# Patient Record
Sex: FEMALE | Race: WHITE | NOT HISPANIC OR LATINO | Employment: OTHER | URBAN - METROPOLITAN AREA
[De-identification: names, ages, dates, MRNs, and addresses within clinical notes are randomized per-mention and may not be internally consistent; named-entity substitution may affect disease eponyms.]

---

## 2017-01-05 ENCOUNTER — APPOINTMENT (OUTPATIENT)
Dept: LAB | Facility: CLINIC | Age: 57
End: 2017-01-05
Payer: MEDICARE

## 2017-01-05 ENCOUNTER — TRANSCRIBE ORDERS (OUTPATIENT)
Dept: LAB | Facility: CLINIC | Age: 57
End: 2017-01-05

## 2017-01-05 DIAGNOSIS — Z11.9 SCREENING EXAMINATION FOR UNSPECIFIED INFECTIOUS DISEASE: Primary | ICD-10-CM

## 2017-01-05 PROCEDURE — 36415 COLL VENOUS BLD VENIPUNCTURE: CPT

## 2017-01-05 PROCEDURE — 86480 TB TEST CELL IMMUN MEASURE: CPT

## 2017-01-07 LAB
ANNOTATION COMMENT IMP: NORMAL
GAMMA INTERFERON BACKGROUND BLD IA-ACNC: 0.03 IU/ML
M TB IFN-G BLD-IMP: NEGATIVE
M TB IFN-G CD4+ BCKGRND COR BLD-ACNC: 0.02 IU/ML
M TB IFN-G CD4+ T-CELLS BLD-ACNC: 0.05 IU/ML
MITOGEN IGNF BLD-ACNC: >10 IU/ML
QUANTIFERON-TB GOLD IN TUBE: NORMAL
SERVICE CMNT-IMP: NORMAL

## 2021-02-19 ENCOUNTER — TRANSCRIBE ORDERS (OUTPATIENT)
Dept: ADMISSIONS | Facility: HOSPITAL | Age: 61
End: 2021-02-19

## 2023-12-12 ENCOUNTER — HOSPITAL ENCOUNTER (OUTPATIENT)
Dept: RADIOLOGY | Facility: HOSPITAL | Age: 63
Discharge: HOME | End: 2023-12-12
Payer: MEDICARE

## 2023-12-12 DIAGNOSIS — J44.9 CHRONIC OBSTRUCTIVE PULMONARY DISEASE, UNSPECIFIED COPD TYPE (HCC): ICD-10-CM

## 2023-12-12 DIAGNOSIS — F17.200 TOBACCO USE DISORDER: ICD-10-CM

## 2023-12-12 DIAGNOSIS — F12.90 MARIJUANA SMOKER: ICD-10-CM

## 2023-12-12 DIAGNOSIS — I73.9 PERIPHERAL VASCULAR DISEASE, UNSPECIFIED (HCC): ICD-10-CM

## 2023-12-12 PROCEDURE — 71271 CT THORAX LUNG CANCER SCR C-: CPT

## 2023-12-12 PROCEDURE — 76706 US ABDL AORTA SCREEN AAA: CPT

## 2024-01-03 ENCOUNTER — HOSPITAL ENCOUNTER (OUTPATIENT)
Dept: RADIOLOGY | Facility: HOSPITAL | Age: 64
Discharge: HOME | End: 2024-01-03
Payer: COMMERCIAL

## 2024-01-03 ENCOUNTER — OFFICE VISIT (OUTPATIENT)
Dept: CARDIOLOGY CLINIC | Facility: CLINIC | Age: 64
End: 2024-01-03
Payer: COMMERCIAL

## 2024-01-03 VITALS
HEART RATE: 75 BPM | OXYGEN SATURATION: 97 % | DIASTOLIC BLOOD PRESSURE: 80 MMHG | WEIGHT: 173 LBS | SYSTOLIC BLOOD PRESSURE: 112 MMHG | BODY MASS INDEX: 29.7 KG/M2

## 2024-01-03 DIAGNOSIS — Z13.1 SCREENING FOR DIABETES MELLITUS: ICD-10-CM

## 2024-01-03 DIAGNOSIS — E78.5 DYSLIPIDEMIA: Primary | ICD-10-CM

## 2024-01-03 DIAGNOSIS — J44.1 COPD EXACERBATION (HCC): ICD-10-CM

## 2024-01-03 DIAGNOSIS — I25.84 CORONARY ARTERY CALCIFICATION: ICD-10-CM

## 2024-01-03 DIAGNOSIS — I25.10 CORONARY ARTERY CALCIFICATION: ICD-10-CM

## 2024-01-03 DIAGNOSIS — R73.03 PREDIABETES: ICD-10-CM

## 2024-01-03 DIAGNOSIS — R09.89 CHEST CONGESTION: ICD-10-CM

## 2024-01-03 PROCEDURE — 99204 OFFICE O/P NEW MOD 45 MIN: CPT | Performed by: INTERNAL MEDICINE

## 2024-01-03 PROCEDURE — 93000 ELECTROCARDIOGRAM COMPLETE: CPT | Performed by: INTERNAL MEDICINE

## 2024-01-03 PROCEDURE — 71046 X-RAY EXAM CHEST 2 VIEWS: CPT

## 2024-01-03 RX ORDER — ALBUTEROL SULFATE 90 UG/1
2 AEROSOL, METERED RESPIRATORY (INHALATION) 4 TIMES DAILY
COMMUNITY
Start: 2023-10-16

## 2024-01-03 RX ORDER — ATORVASTATIN CALCIUM 20 MG/1
20 TABLET, FILM COATED ORAL DAILY
COMMUNITY
Start: 2023-10-16

## 2024-01-03 RX ORDER — GUAIFENESIN 600 MG/1
600 TABLET, EXTENDED RELEASE ORAL 2 TIMES DAILY
COMMUNITY
Start: 2023-12-27

## 2024-01-03 RX ORDER — ASPIRIN 81 MG/1
81 TABLET, CHEWABLE ORAL ONCE
Status: DISCONTINUED | OUTPATIENT
Start: 2024-01-03 | End: 2024-01-03

## 2024-01-03 RX ORDER — CITALOPRAM 40 MG/1
40 TABLET, FILM COATED ORAL DAILY
COMMUNITY

## 2024-01-03 RX ORDER — MELOXICAM 15 MG/1
15 TABLET ORAL DAILY PRN
COMMUNITY

## 2024-01-03 RX ORDER — CITALOPRAM 20 MG/1
20 TABLET ORAL DAILY
COMMUNITY

## 2024-01-03 RX ORDER — ALBUTEROL SULFATE 2.5 MG/3ML
360 SOLUTION RESPIRATORY (INHALATION) EVERY 4 HOURS PRN
COMMUNITY
Start: 2023-12-26

## 2024-01-03 RX ORDER — OMEGA-3 FATTY ACIDS/FISH OIL 300-1000MG
200 CAPSULE ORAL AS NEEDED
COMMUNITY

## 2024-01-03 NOTE — LETTER
January 3, 2024     JANNET Sullivan  [address]  [place] [address]    Patient: Lisa Devries   YOB: 1960   Date of Visit: 1/3/2024       Dear Ms. Shelley:    Thank you for referring Lisa Devries to me for evaluation. Below are my notes for this consultation.    If you have questions, please do not hesitate to call me. I look forward to following your patient along with you.         Sincerely,        Alex Tian MD        CC: No Recipients    Alex Tian MD  1/3/2024  1:50 PM  Sign when Signing Visit  Cardiology   Lisa Devries 63 y.o. female MRN: [id number]   Encounter: [id number]        Reason for Consult / Principal Problem: Coronary calcification    Provider Requesting Consult: JANNET Sullivan    PCP: JANNET Sullivan        Assessment/Plan:    >> Calcifications on coronary arteries  >> COPD  >> Dyslipidemia  >> Active smoking  >> Pre diabetes    Plan:    - Start baby aspirin 81 mg.  - Continue 20 mg atorvastatin.  Target LDL less than 55  - Repeat lipid profile and check HbA1c in 3 months  - Counseled extensively on quitting smoking  -COPD treatment per primary  -Return to office 4 to 5 months after repeat testing.        CC: Coronary calcifications/establish care    HPI  63 y.o. female with the above complaints presents to establish care.  She is a long-term smoker (greater than 30 pack years).  She was having a chest CT to screen for lung cancer.  Multiple nodules were detected, incidentally coronary calcifications were detected including calcifications in the left main and LAD.    She has no symptoms.  She does exercise by walking and has not had any chest pain or dyspnea on exertion.  She has chronic cough and expectoration and takes Mucinex for the same.      The patient denies chest pain, shortness of breath, palpitations, orthopnea, PND, pedal edema, syncope, presyncope, diaphoresis, nausea/vomiting               Physical exam  Objective  Vitals: Blood pressure 112/80,  pulse 75, weight 78.5 kg (173 lb), SpO2 97%.    General:  AO x3, no acute distress  Cardiac:  S1-S2 normal,  No murmurs. No rubs or gallops, JVP: normal  Lungs: Mild bilateral wheezing  Abdomen:  Soft, nontender, nondistended.  Extremities:  Warm, well perfused, pulses palpable, no ulcers or rashes. Edema:absent  Neuro: Grossly nonfocal        ======================================================  TREADMILL STRESS  No results found for this or any previous visit.     ----------------------------------------------------------------------------------------------  NUCLEAR STRESS TEST: No results found for this or any previous visit.    No results found for this or any previous visit.      --------------------------------------------------------------------------------  CATH:  No results found for this or any previous visit.    --------------------------------------------------------------------------------  ECHO: 12/6/2023:  •  Left Ventricle: Systolic function is low normal with an ejection   fraction of 50-55%. There is grade I (mild) diastolic dysfunction.   •  Left Atrium: Left atrium cavity is mildly dilated.     Left Ventricle   Left ventricle is normal in size. Wall thickness is normal. Systolic function is low normal with an ejection fraction of 50-55%. Wall motion is within normal limits. There is grade I (mild) diastolic dysfunction.     Right Ventricle   Right ventricle cavity is normal. Systolic function is normal.     Left Atrium   Left atrium cavity is mildly dilated.     Right Atrium   Right atrium cavity is normal.     IVC/SVC   The inferior vena cava demonstrates a diameter of <=21 mm and collapses >50%; therefore, the right atrial pressure is estimated at 0-5 mmHg.     Mitral Valve   Mitral valve structure is normal. There is no regurgitation or stenosis.     Tricuspid Valve   Tricuspid valve structure is normal. There is trace regurgitation. There is no evidence of tricuspid valve stenosis.      Aortic Valve   The aortic valve is trileaflet. There is no regurgitation or stenosis.     Pulmonic Valve   Pulmonic valve structure is normal. There is no regurgitation or stenosis.     Ascending Aorta   The aorta appears normal in size.     Pericardium   There is no pericardial effusion.       --------------------------------------------------------------------------------  HOLTER  No results found for this or any previous visit.    --------------------------------------------------------------------------------  CAROTIDS  No results found for this or any previous visit.       There are no diagnoses linked to this encounter.   ======================================================          Review of Systems  ROS as noted above, otherwise 12 point review of systems was performed and is negative.     Historical Information  No past medical history on file.  No past surgical history on file.  Social History     Substance and Sexual Activity   Alcohol Use None     Social History     Substance and Sexual Activity   Drug Use Not on file     Social History     Tobacco Use   Smoking Status Every Day   • Current packs/day: 0.50   • Average packs/day: 0.5 packs/day for 44.0 years (22.0 ttl pk-yrs)   • Types: Cigarettes   Smokeless Tobacco Never     No family history on file.    Meds/Allergies  Hospital Medications:   No current facility-administered medications for this visit.     Home Medications: (Not in a hospital admission)      Allergies   Allergen Reactions   • Other Anaphylaxis     Throat swells   • Cefadroxil Other (See Comments)   • Codeine Other (See Comments)     Reaction Date: 19May2011;     increased HR   • Gluten Meal - Food Allergy Other (See Comments)     sensitivity   • Indomethacin Other (See Comments)     Reaction Date: 19May2011;   • Oxycodone Other (See Comments)     percocet increased HR    Reaction Date: 19May2011;   • Tuberculin Ppd Other (See Comments)     She gets a positive reaction to it but  "does not have tb   • Sulfa Antibiotics Rash   • Tetracycline Rash         Portions of the record may have been created with voice recognition software.  Occasional wrong words or \"sound a like\" substitutions may have occurred due to the inherent limitations of voice recognition software.  Read the chart carefully and recognize, using context, where substitutions have occurred.        I have spent a total of 55 min in reviewing and/or ordering tests, medications, or procedures, performing an examination or evaluation, reviewing pertinent history, counseling and educating the patient, referring and/or communicating with other health care professionals, documenting in the EMR and general coordination of care of the patient today.               "

## 2024-01-03 NOTE — PROGRESS NOTES
Cardiology   Lisa Devries 63 y.o. female MRN: [id number]   Encounter: [id number]        Reason for Consult / Principal Problem: Coronary calcification    Provider Requesting Consult: JANNET Sullivan    PCP: JANNET Sullivan        Assessment/Plan:    >> Calcifications on coronary arteries  >> COPD  >> Dyslipidemia  >> Active smoking  >> Pre diabetes    Plan:    - Start baby aspirin 81 mg.  - Continue 20 mg atorvastatin.  Target LDL less than 55  - Repeat lipid profile and check HbA1c in 3 months  - Counseled extensively on quitting smoking  -COPD treatment per primary  -Return to office 4 to 5 months after repeat testing.        CC: Coronary calcifications/establish care    [place]  63 y.o. female with the above complaints presents to establish care.  She is a long-term smoker (greater than 30 pack years).  She was having a chest CT to screen for lung cancer.  Multiple nodules were detected, incidentally coronary calcifications were detected including calcifications in the left main and LAD.    She has no symptoms.  She does exercise by walking and has not had any chest pain or dyspnea on exertion.  She has chronic cough and expectoration and takes Mucinex for the same.      The patient denies chest pain, shortness of breath, palpitations, orthopnea, PND, pedal edema, syncope, presyncope, diaphoresis, nausea/vomiting               Physical exam  Objective   Vitals: Blood pressure 112/80, pulse 75, weight 78.5 kg (173 lb), SpO2 97%.    General:  AO x3, no acute distress  Cardiac:  S1-S2 normal,  No murmurs. No rubs or gallops, JVP: normal  Lungs: Mild bilateral wheezing  Abdomen:  Soft, nontender, nondistended.  Extremities:  Warm, well perfused, pulses palpable, no ulcers or rashes. Edema:absent  Neuro: Grossly nonfocal        ======================================================  TREADMILL STRESS  No results found for this or any previous visit.      ----------------------------------------------------------------------------------------------  NUCLEAR STRESS TEST: No results found for this or any previous visit.    No results found for this or any previous visit.      --------------------------------------------------------------------------------  CATH:  No results found for this or any previous visit.    --------------------------------------------------------------------------------  ECHO: 12/6/2023:    Left Ventricle: Systolic function is low normal with an ejection   fraction of 50-55%. There is grade I (mild) diastolic dysfunction.     Left Atrium: Left atrium cavity is mildly dilated.     Left Ventricle   Left ventricle is normal in size. Wall thickness is normal. Systolic function is low normal with an ejection fraction of 50-55%. Wall motion is within normal limits. There is grade I (mild) diastolic dysfunction.     Right Ventricle   Right ventricle cavity is normal. Systolic function is normal.     Left Atrium   Left atrium cavity is mildly dilated.     Right Atrium   Right atrium cavity is normal.     IVC/SVC   The inferior vena cava demonstrates a diameter of <=21 mm and collapses >50%; therefore, the right atrial pressure is estimated at 0-5 mmHg.     Mitral Valve   Mitral valve structure is normal. There is no regurgitation or stenosis.     Tricuspid Valve   Tricuspid valve structure is normal. There is trace regurgitation. There is no evidence of tricuspid valve stenosis.     Aortic Valve   The aortic valve is trileaflet. There is no regurgitation or stenosis.     Pulmonic Valve   Pulmonic valve structure is normal. There is no regurgitation or stenosis.     Ascending Aorta   The aorta appears normal in size.     Pericardium   There is no pericardial effusion.       --------------------------------------------------------------------------------  HOLTER  No results found for this or any previous  "visit.    --------------------------------------------------------------------------------  CAROTIDS  No results found for this or any previous visit.       There are no diagnoses linked to this encounter.   ======================================================          Review of Systems  ROS as noted above, otherwise 12 point review of systems was performed and is negative.     Historical Information   No past medical history on file.  No past surgical history on file.  Social History     Substance and Sexual Activity   Alcohol Use None     Social History     Substance and Sexual Activity   Drug Use Not on file     Social History     Tobacco Use   Smoking Status Every Day    Current packs/day: 0.50    Average packs/day: 0.5 packs/day for 44.0 years (22.0 ttl pk-yrs)    Types: Cigarettes   Smokeless Tobacco Never     No family history on file.    Meds/Allergies   Hospital Medications:   No current facility-administered medications for this visit.     Home Medications: (Not in a hospital admission)      Allergies   Allergen Reactions    Other Anaphylaxis     Throat swells    Cefadroxil Other (See Comments)    Codeine Other (See Comments)     Reaction Date: 19May2011;     increased HR    Gluten Meal - Food Allergy Other (See Comments)     sensitivity    Indomethacin Other (See Comments)     Reaction Date: 19May2011;    Oxycodone Other (See Comments)     percocet increased HR    Reaction Date: 19May2011;    Tuberculin Ppd Other (See Comments)     She gets a positive reaction to it but does not have tb    Sulfa Antibiotics Rash    Tetracycline Rash         Portions of the record may have been created with voice recognition software.  Occasional wrong words or \"sound a like\" substitutions may have occurred due to the inherent limitations of voice recognition software.  Read the chart carefully and recognize, using context, where substitutions have occurred.        I have spent a total of 55 min in reviewing and/or " ordering tests, medications, or procedures, performing an examination or evaluation, reviewing pertinent history, counseling and educating the patient, referring and/or communicating with other health care professionals, documenting in the EMR and general coordination of care of the patient today.

## 2024-02-05 ENCOUNTER — HOSPITAL ENCOUNTER (OUTPATIENT)
Dept: RADIOLOGY | Facility: HOSPITAL | Age: 64
Discharge: HOME | End: 2024-02-05
Payer: COMMERCIAL

## 2024-02-05 DIAGNOSIS — J44.1 OBSTRUCTIVE CHRONIC BRONCHITIS WITH EXACERBATION (HCC): ICD-10-CM

## 2024-02-05 DIAGNOSIS — R09.89 ABNORMAL CHEST SOUNDS: ICD-10-CM

## 2024-02-05 PROCEDURE — 71046 X-RAY EXAM CHEST 2 VIEWS: CPT

## 2025-01-08 ENCOUNTER — HOSPITAL ENCOUNTER (EMERGENCY)
Facility: HOSPITAL | Age: 65
Discharge: HOME | End: 2025-01-08
Attending: EMERGENCY MEDICINE | Admitting: EMERGENCY MEDICINE
Payer: COMMERCIAL

## 2025-01-08 ENCOUNTER — APPOINTMENT (EMERGENCY)
Dept: RADIOLOGY | Facility: HOSPITAL | Age: 65
End: 2025-01-08
Payer: COMMERCIAL

## 2025-01-08 VITALS
DIASTOLIC BLOOD PRESSURE: 80 MMHG | OXYGEN SATURATION: 96 % | TEMPERATURE: 97.9 F | RESPIRATION RATE: 18 BRPM | HEART RATE: 88 BPM | SYSTOLIC BLOOD PRESSURE: 114 MMHG

## 2025-01-08 DIAGNOSIS — S20.211A CHEST WALL CONTUSION, RIGHT, INITIAL ENCOUNTER: ICD-10-CM

## 2025-01-08 DIAGNOSIS — J06.9 VIRAL URI WITH COUGH: Primary | ICD-10-CM

## 2025-01-08 LAB
ATRIAL RATE: 80 BPM
P AXIS: 82 DEGREES
PR INTERVAL: 140 MS
QRS AXIS: 80 DEGREES
QRSD INTERVAL: 72 MS
QT INTERVAL: 372 MS
QTC INTERVAL: 430 MS
T WAVE AXIS: 89 DEGREES
VENTRICULAR RATE: 80 BPM

## 2025-01-08 PROCEDURE — 99284 EMERGENCY DEPT VISIT MOD MDM: CPT | Performed by: EMERGENCY MEDICINE

## 2025-01-08 PROCEDURE — 93005 ELECTROCARDIOGRAM TRACING: CPT

## 2025-01-08 PROCEDURE — 96361 HYDRATE IV INFUSION ADD-ON: CPT

## 2025-01-08 PROCEDURE — 99284 EMERGENCY DEPT VISIT MOD MDM: CPT

## 2025-01-08 PROCEDURE — 93010 ELECTROCARDIOGRAM REPORT: CPT | Performed by: INTERNAL MEDICINE

## 2025-01-08 PROCEDURE — 96374 THER/PROPH/DIAG INJ IV PUSH: CPT

## 2025-01-08 PROCEDURE — 71101 X-RAY EXAM UNILAT RIBS/CHEST: CPT

## 2025-01-08 RX ORDER — ONDANSETRON 2 MG/ML
4 INJECTION INTRAMUSCULAR; INTRAVENOUS ONCE
Status: COMPLETED | OUTPATIENT
Start: 2025-01-08 | End: 2025-01-08

## 2025-01-08 RX ORDER — ONDANSETRON 4 MG/1
4 TABLET, FILM COATED ORAL EVERY 6 HOURS
Qty: 12 TABLET | Refills: 0 | Status: SHIPPED | OUTPATIENT
Start: 2025-01-08

## 2025-01-08 RX ADMIN — ONDANSETRON 4 MG: 2 INJECTION INTRAMUSCULAR; INTRAVENOUS at 12:21

## 2025-01-08 RX ADMIN — SODIUM CHLORIDE 1000 ML: 0.9 INJECTION, SOLUTION INTRAVENOUS at 12:27

## 2025-01-08 NOTE — ED PROVIDER NOTES
Final Diagnosis:  1. Viral URI with cough    2. Chest wall contusion, right, initial encounter      Chief Complaint   Patient presents with    Cough    Fall     Pt states she has been having dizziness, cough, vomiting, chills.  Pt fell Friday on her hands and knees.  Pt denies any head strike.       64-year-old woman presents for evaluation of lightheadedness, cough, vomiting, myalgias.  Some symptoms ongoing since November mostly nasal congestion.  Patient fell last week onto her right side complains of right sided rib pain no difficulty breathing however does have pain when she takes deep breath.  No other complaints at this time.    Review of systems: Noted as above      PMH:  Past Medical History:   Diagnosis Date    COPD (chronic obstructive pulmonary disease) (HCC)     Fibromyalgia     Osteoarthritis      PSH:  History reviewed. No pertinent surgical history.    PE:   Vitals:    01/08/25 1149   BP: 114/80   BP Location: Left arm   Pulse: 88   Resp: 18   Temp: 97.9 °F (36.6 °C)   TempSrc: Temporal   SpO2: 96%       General: VSS, NAD, awake, alert. Well-nourished, well-developed. Appears stated age.   Speaking normally in full sentences.   Head: Normocephalic, atraumatic, nontender.  Eyes: PERRL, EOM-I. No diplopia.   No hyphema.   No subconjunctival hemorrhages.  Symmetrical lids.   ENT: Atraumatic external nose and ears.    MMM  No malocclusion. No stridor. Normal phonation. No drooling. Normal swallowing.   Neck: Symmetric, trachea midline. No JVD.  CV: RRR. +S1/S2  No murmurs or gallops  Peripheral pulses +2 throughout. R chest wall TTP over rib 7-10 midaxillary region.  Lungs:   Unlabored No retractions  CTAB, lungs sounds equal bilateral.   No tachypnea.   Abd: +BS, soft, NT/ND.   MSK:   FROM   Back:   No rashes  Skin: Dry, intact.   Neuro: AAOx3, GCS 15, CN II-XII grossly intact.   Motor grossly intact.  Psychiatric/Behavioral: Appropriate mood and affect   Exam: deferred        A:  - multiple c/o  likely viral syndrome  - dehydration  - fall, R chest wall contusion. Consider fx  P:  - IV fluids  - zofran  - Rib series R side.  - 13 point ROS was performed and all are normal unless stated in the history above.   - Nursing note reviewed. Vitals reviewed.   - Orders placed by myself and/or advanced practitioner / resident.    - Previous chart was reviewed  - No language barrier.   - History obtained from patient.   - There are no limitations to the history obtained.   ED Course as of 01/08/25 1326   Wed Jan 08, 2025   1324 Feels better, requests work note. Given long duration of sinus symptoms, will rx amox/clav. Clarified that pts allergy to cefadroxil (similar side chain to pen/amox) is a rash. If this is all that occurs, that is ok. If she develops additional symptoms with her rash she should dc abx.     Medications   sodium chloride 0.9 % bolus 1,000 mL (1,000 mL Intravenous New Bag 1/8/25 1227)   ondansetron (ZOFRAN) injection 4 mg (4 mg Intravenous Given 1/8/25 1221)     XR ribs with pa chest min 3 views RIGHT   ED Interpretation   No acute osseous abnormality. No acute cardiopulmonary pathology.              Orders Placed This Encounter   Procedures    XR ribs with pa chest min 3 views RIGHT    Insert peripheral IV    ECG 12 lead     Labs Reviewed - No data to display  Time reflects when diagnosis was documented in both MDM as applicable and the Disposition within this note       Time User Action Codes Description Comment    1/8/2025  1:14 PM Isma Fox Add [J06.9] Viral URI with cough     1/8/2025  1:14 PM Isma Fox Add [S20.211A] Chest wall contusion, right, initial encounter           ED Disposition       ED Disposition   Discharge    Condition   Stable    Date/Time   Wed Jan 8, 2025  1:14 PM    Comment   Lisa Devries discharge to home/self care.                   Follow-up Information       Follow up With Specialties Details Why Contact Info    [facility], Nurse  "Practitioner Schedule an appointment as soon as possible for a visit   [address]  [place] [address]  [phone]            Patient's Medications   Discharge Prescriptions    AMOXICILLIN-CLAVULANATE (AUGMENTIN) 875-125 MG PER TABLET    Take 1 tablet by mouth every 12 (twelve) hours for 7 days       Start Date: 1/8/2025  End Date: 1/15/2025       Order Dose: 1 tablet       Quantity: 14 tablet    Refills: 0    ONDANSETRON (ZOFRAN) 4 MG TABLET    Take 1 tablet (4 mg total) by mouth every 6 (six) hours       Start Date: 1/8/2025  End Date: --       Order Dose: 4 mg       Quantity: 12 tablet    Refills: 0     No discharge procedures on file.  Prior to Admission Medications   Prescriptions Last Dose Informant Patient Reported? Taking?   Acetaminophen 500 MG   Yes No   Sig: Take 500 mg by mouth every 6 (six) hours as needed   CeleXA 40 MG tablet   Yes No   Sig: Take 40 mg by mouth daily   Ibuprofen 200 MG CAPS   Yes No   Sig: Take 200 mg by mouth if needed   Mucus Relief 600 MG 12 hr tablet   Yes No   Sig: Take 600 mg by mouth 2 (two) times a day   albuterol (2.5 mg/3 mL) 0.083 % nebulizer solution   Yes No   Sig: Take 360 mL by nebulization every 4 (four) hours as needed   albuterol (PROVENTIL HFA,VENTOLIN HFA) 90 mcg/act inhaler   Yes No   Sig: Inhale 2 puffs 4 (four) times a day   atorvastatin (LIPITOR) 20 mg tablet   Yes No   Sig: Take 20 mg by mouth daily   citalopram (CeleXA) 20 mg tablet   Yes No   Sig: Take 20 mg by mouth daily   enoxaparin (LOVENOX) 40 mg/0.4 mL   Yes No   Sig: Inject 40 mg under the skin   Patient not taking: Reported on 1/3/2024   meloxicam (MOBIC) 15 mg tablet   Yes No   Sig: Take 15 mg by mouth daily as needed      Facility-Administered Medications: None       Portions of the record may have been created with voice recognition software. Occasional wrong word or \"sound a like\" substitutions may have occurred due to the inherent limitations of voice recognition software. Read the " chart carefully and recognize, using context, where substitutions have occurred.    Electronically signed by:  DO Isma Dill,   01/08/25 1324       Isma Fox,   01/08/25 132

## 2025-01-08 NOTE — Clinical Note
Lisa Devries was seen and treated in our emergency department on 1/8/2025.    No restrictions            Diagnosis:       .    She may return on this date: 01/13/2025         If you have any questions or concerns, please don't hesitate to call.      Isma Fox, DO    ______________________________           _______________          _______________  Hospital Representative                              Date                                Time

## 2025-01-21 ENCOUNTER — TELEPHONE (OUTPATIENT)
Age: 65
End: 2025-01-21

## 2025-01-21 NOTE — TELEPHONE ENCOUNTER
Daphney called from dental practice to ask if patient needed to premed. Advised patient is not an ortho patient.

## 2025-02-01 ENCOUNTER — HOSPITAL ENCOUNTER (EMERGENCY)
Facility: HOSPITAL | Age: 65
Discharge: HOME | End: 2025-02-01
Attending: EMERGENCY MEDICINE | Admitting: EMERGENCY MEDICINE
Payer: COMMERCIAL

## 2025-02-01 VITALS
RESPIRATION RATE: 16 BRPM | SYSTOLIC BLOOD PRESSURE: 126 MMHG | OXYGEN SATURATION: 97 % | WEIGHT: 167 LBS | TEMPERATURE: 97.1 F | HEART RATE: 94 BPM | DIASTOLIC BLOOD PRESSURE: 78 MMHG

## 2025-02-01 DIAGNOSIS — S39.011A STRAIN OF ABDOMINAL MUSCLE, INITIAL ENCOUNTER: Primary | ICD-10-CM

## 2025-02-01 PROCEDURE — 99282 EMERGENCY DEPT VISIT SF MDM: CPT

## 2025-02-01 PROCEDURE — 99284 EMERGENCY DEPT VISIT MOD MDM: CPT | Performed by: EMERGENCY MEDICINE

## 2025-02-01 RX ORDER — ACETAMINOPHEN 325 MG/1
975 TABLET ORAL ONCE
Status: COMPLETED | OUTPATIENT
Start: 2025-02-01 | End: 2025-02-01

## 2025-02-01 RX ORDER — NAPROXEN 500 MG/1
500 TABLET ORAL 2 TIMES DAILY WITH MEALS
Qty: 20 TABLET | Refills: 0 | Status: SHIPPED | OUTPATIENT
Start: 2025-02-01 | End: 2025-02-11

## 2025-02-01 RX ORDER — NAPROXEN 500 MG/1
500 TABLET ORAL ONCE
Status: COMPLETED | OUTPATIENT
Start: 2025-02-01 | End: 2025-02-01

## 2025-02-01 RX ADMIN — NAPROXEN 500 MG: 500 TABLET ORAL at 16:17

## 2025-02-01 RX ADMIN — ACETAMINOPHEN 975 MG: 325 TABLET, FILM COATED ORAL at 16:17

## 2025-02-01 NOTE — Clinical Note
Lisa Devries was seen and treated in our emergency department on 2/1/2025.                Diagnosis:       may return to work on return date.    She may return on this date: 02/02/2025         If you have any questions or concerns, please don't hesitate to call.      Vincent Wilson MD    ______________________________           _______________          _______________  Hospital Representative                              Date                                Time
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
## 2025-07-26 ENCOUNTER — HOSPITAL ENCOUNTER (OUTPATIENT)
Dept: RADIOLOGY | Facility: HOSPITAL | Age: 65
Discharge: HOME | End: 2025-07-26
Attending: NURSE PRACTITIONER
Payer: COMMERCIAL

## 2025-07-26 DIAGNOSIS — F17.200 TOBACCO DEPENDENCE: ICD-10-CM

## 2025-07-26 PROCEDURE — 71271 CT THORAX LUNG CANCER SCR C-: CPT

## 2025-08-20 ENCOUNTER — HOSPITAL ENCOUNTER (OUTPATIENT)
Dept: RADIOLOGY | Facility: HOSPITAL | Age: 65
Discharge: HOME | End: 2025-08-20
Payer: COMMERCIAL

## 2025-08-20 DIAGNOSIS — Z00.00 ROUTINE PHYSICAL EXAMINATION: ICD-10-CM

## 2025-08-20 PROCEDURE — 71046 X-RAY EXAM CHEST 2 VIEWS: CPT
